# Patient Record
Sex: FEMALE | Race: WHITE | URBAN - NONMETROPOLITAN AREA
[De-identification: names, ages, dates, MRNs, and addresses within clinical notes are randomized per-mention and may not be internally consistent; named-entity substitution may affect disease eponyms.]

---
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
## 2021-10-14 ENCOUNTER — OFFICE VISIT (OUTPATIENT)
Dept: URBAN - NONMETROPOLITAN AREA CLINIC 10 | Facility: CLINIC | Age: 21
End: 2021-10-14
Payer: COMMERCIAL

## 2021-10-14 DIAGNOSIS — H53.9 UNSPECIFIED VISUAL DISTURBANCE: ICD-10-CM

## 2021-10-14 DIAGNOSIS — H52.13 MYOPIA, BILATERAL: Primary | ICD-10-CM

## 2021-10-14 PROCEDURE — 92014 COMPRE OPH EXAM EST PT 1/>: CPT | Performed by: STUDENT IN AN ORGANIZED HEALTH CARE EDUCATION/TRAINING PROGRAM

## 2021-10-14 ASSESSMENT — VISUAL ACUITY
OD: 20/20
OS: 20/20

## 2021-10-14 ASSESSMENT — INTRAOCULAR PRESSURE
OS: 18
OD: 20

## 2021-10-14 NOTE — IMPRESSION/PLAN
Impression: Unspecified visual disturbance: H53.9.
-Headaches once per week
-constant heatwave in vision x years
-nerve looks healthy, no edema
-no ocular etiology noted on exam today. Plan: Pt ed on findings.  Will RTC for VF 30-2 and RNFL OCT to rule out IIH, etc.

## 2021-10-14 NOTE — IMPRESSION/PLAN
Impression: Myopia, bilateral: H52.13. Plan: Patient educated on refractive error. New glasses prescription dispensed to patient, expires 1 year.